# Patient Record
Sex: MALE | Race: WHITE | URBAN - METROPOLITAN AREA
[De-identification: names, ages, dates, MRNs, and addresses within clinical notes are randomized per-mention and may not be internally consistent; named-entity substitution may affect disease eponyms.]

---

## 2018-06-12 ENCOUNTER — APPOINTMENT (RX ONLY)
Dept: URBAN - METROPOLITAN AREA CLINIC 157 | Facility: CLINIC | Age: 33
Setting detail: DERMATOLOGY
End: 2018-06-12

## 2018-06-12 VITALS — WEIGHT: 230 LBS | DIASTOLIC BLOOD PRESSURE: 95 MMHG | SYSTOLIC BLOOD PRESSURE: 137 MMHG | HEART RATE: 106 BPM

## 2018-06-12 DIAGNOSIS — L92.3 FOREIGN BODY GRANULOMA OF THE SKIN AND SUBCUTANEOUS TISSUE: ICD-10-CM

## 2018-06-12 PROBLEM — L30.9 DERMATITIS, UNSPECIFIED: Status: ACTIVE | Noted: 2018-06-12

## 2018-06-12 PROCEDURE — ? INCISIONAL BIOPSY

## 2018-06-12 PROCEDURE — 11100: CPT

## 2018-06-12 ASSESSMENT — LOCATION SIMPLE DESCRIPTION DERM: LOCATION SIMPLE: LEFT FOREARM

## 2018-06-12 ASSESSMENT — LOCATION DETAILED DESCRIPTION DERM: LOCATION DETAILED: LEFT DISTAL DORSAL FOREARM

## 2018-06-12 ASSESSMENT — LOCATION ZONE DERM: LOCATION ZONE: ARM

## 2018-06-12 NOTE — PROCEDURE: INCISIONAL BIOPSY
Anesthesia Type: 1% lidocaine with epinephrine
Notification Instructions: Patient will be notified of biopsy results. However, patient instructed to call the office if not contacted within 2 weeks.
Epidermal Sutures: 4-0 Nylon
Depth Of Biopsy: adipose tissue
Wound Care: Petrolatum
Biopsy Type: H and E
Scalpel Type: 15 blade
Size Of Lesion In Cm (Optional): 3
Detail Level: Detailed
Anesthesia Volume In Cc: 1.5
Billing Type: Third-Party Bill
Render Post-Care Instructions In Note?: no
Consent: Written consent was obtained and risks were reviewed including but not limited to scarring, infection, bleeding, scabbing, incomplete removal, nerve damage and allergy to anesthesia.
Path Notes (To The Dermatopathologist): Please section longitudinally.  DO NOT BREADLOAF.
Deep Sutures: 4-0 Polysorb
Post-Care Instructions: I reviewed with the patient in detail post-care instructions. Patient is to keep the biopsy site dry overnight, and then apply bacitracin twice daily until healed. Patient may apply hydrogen peroxide soaks to remove any crusting.
Hemostasis: None
Suture Removal: 14 days
X Size Of Lesion In Cm (Optional): 2.5